# Patient Record
Sex: MALE | Race: WHITE | URBAN - METROPOLITAN AREA
[De-identification: names, ages, dates, MRNs, and addresses within clinical notes are randomized per-mention and may not be internally consistent; named-entity substitution may affect disease eponyms.]

---

## 2022-10-27 ENCOUNTER — OFFICE VISIT (OUTPATIENT)
Dept: FAMILY MEDICINE CLINIC | Age: 57
End: 2022-10-27
Payer: COMMERCIAL

## 2022-10-27 VITALS
SYSTOLIC BLOOD PRESSURE: 122 MMHG | HEART RATE: 78 BPM | WEIGHT: 186 LBS | OXYGEN SATURATION: 95 % | BODY MASS INDEX: 28.19 KG/M2 | DIASTOLIC BLOOD PRESSURE: 86 MMHG | HEIGHT: 68 IN

## 2022-10-27 DIAGNOSIS — Z12.11 COLON CANCER SCREENING: ICD-10-CM

## 2022-10-27 DIAGNOSIS — Z12.5 SCREENING FOR PROSTATE CANCER: ICD-10-CM

## 2022-10-27 DIAGNOSIS — Z76.89 ENCOUNTER TO ESTABLISH CARE: Primary | ICD-10-CM

## 2022-10-27 DIAGNOSIS — Z13.220 SCREENING CHOLESTEROL LEVEL: ICD-10-CM

## 2022-10-27 DIAGNOSIS — Z13.1 SCREENING FOR DIABETES MELLITUS: ICD-10-CM

## 2022-10-27 DIAGNOSIS — L72.0 EPIDERMAL CYST OF NECK: ICD-10-CM

## 2022-10-27 PROCEDURE — G8427 DOCREV CUR MEDS BY ELIG CLIN: HCPCS

## 2022-10-27 PROCEDURE — 3017F COLORECTAL CA SCREEN DOC REV: CPT

## 2022-10-27 PROCEDURE — 99203 OFFICE O/P NEW LOW 30 MIN: CPT

## 2022-10-27 PROCEDURE — G8484 FLU IMMUNIZE NO ADMIN: HCPCS

## 2022-10-27 PROCEDURE — 1036F TOBACCO NON-USER: CPT

## 2022-10-27 PROCEDURE — G8419 CALC BMI OUT NRM PARAM NOF/U: HCPCS

## 2022-10-27 RX ORDER — CEPHALEXIN 500 MG/1
500 CAPSULE ORAL 2 TIMES DAILY
Qty: 14 CAPSULE | Refills: 0 | Status: SHIPPED | OUTPATIENT
Start: 2022-10-27 | End: 2022-11-03

## 2022-10-27 SDOH — ECONOMIC STABILITY: FOOD INSECURITY: WITHIN THE PAST 12 MONTHS, THE FOOD YOU BOUGHT JUST DIDN'T LAST AND YOU DIDN'T HAVE MONEY TO GET MORE.: NEVER TRUE

## 2022-10-27 SDOH — ECONOMIC STABILITY: FOOD INSECURITY: WITHIN THE PAST 12 MONTHS, YOU WORRIED THAT YOUR FOOD WOULD RUN OUT BEFORE YOU GOT MONEY TO BUY MORE.: NEVER TRUE

## 2022-10-27 ASSESSMENT — PATIENT HEALTH QUESTIONNAIRE - PHQ9
1. LITTLE INTEREST OR PLEASURE IN DOING THINGS: 0
2. FEELING DOWN, DEPRESSED OR HOPELESS: 0
SUM OF ALL RESPONSES TO PHQ QUESTIONS 1-9: 0
SUM OF ALL RESPONSES TO PHQ9 QUESTIONS 1 & 2: 0
SUM OF ALL RESPONSES TO PHQ QUESTIONS 1-9: 0

## 2022-10-27 ASSESSMENT — ENCOUNTER SYMPTOMS
CONSTIPATION: 0
EYE PAIN: 0
NAUSEA: 0
EYE DISCHARGE: 0
ABDOMINAL PAIN: 0
DIARRHEA: 0
SINUS PAIN: 0
SORE THROAT: 0
COUGH: 0
WHEEZING: 0
EYE REDNESS: 0
VOMITING: 0
SINUS PRESSURE: 0
ABDOMINAL DISTENTION: 0
SHORTNESS OF BREATH: 0

## 2022-10-27 ASSESSMENT — SOCIAL DETERMINANTS OF HEALTH (SDOH): HOW HARD IS IT FOR YOU TO PAY FOR THE VERY BASICS LIKE FOOD, HOUSING, MEDICAL CARE, AND HEATING?: NOT HARD AT ALL

## 2022-10-27 NOTE — PROGRESS NOTES
Divina Monahan (:  1965) is a 61 y.o. male,Established patient, here for evaluation of the following chief complaint(s):  New Patient (NEW PATIENT RE EST CARE ), Cyst (CYST ON LEFT SIDE OF NECK, NOTICED ABOUT 2 MO AGO, DRAINING ), and Ear Fullness (LEFT EAR FULLNESS, POSSIBLE WAX IMPACTION )         ASSESSMENT/PLAN:  1. Encounter to establish care  -Personal medical history, surgical history, family history reviewed. Medications reconciled. -Care gaps addressed. Declined screenings and vaccinations today.  -Educated on office policies and procedures.  -Follow-up in 1 year for annual physical and fasting blood work. 2. Epidermal cyst of neck  -Cyst to the left of the neck. -Since this it opened on its own, I was able to manually express some pus as well as some blood.  -Continues to be firm, tender, elevated, and erythematic.  -Treatment options discussed. Keflex is being sent to the pharmacy. The medication uses and side effects were discussed with the patient. Patient verbalized understanding and agrees to the plan.  -Educated to use warm compresses as well to help express more pus. Can continue cover with a Band-Aid.  -Follow-up if symptoms worsen or fail to improve. -     cephALEXin (KEFLEX) 500 MG capsule; Take 1 capsule by mouth 2 times daily for 7 days, Disp-14 capsule, R-0Normal  3. Colon cancer screening  -     Fecal DNA Colorectal cancer screening (Cologuard)  4. Screening for diabetes mellitus  -     Comprehensive Metabolic Panel; Future  5. Screening for prostate cancer  -     PSA Screening; Future  6. Screening cholesterol level  -     Lipid, Fasting; Future    Return in about 1 year (around 10/27/2023) for Physical, Fasting Labs. Subjective   SUBJECTIVE/OBJECTIVE:  SUSAN  Marion Buerger presents today to reestablish care. He also has been experiencing some left ear muffled hearing for the last month and a half or so. He thinks this is due to wax buildup.  Debrox usually works but has not this time. He also has had a cyst on left neck for 2 months. It kept growing, then opened up in last week, clear and white milky discharge. He has since covered in a band aid. Hot showers help. Works outside at [employer], where he is pretty active. Otherwise pretty healthy. He has had a sore right shoulder ever since fall 2013, as well as a sore left shoulder pain since a 2014 fall on ice. He takes ibuprofen, 2 at time, which works pretty well. Lidocaine patches don't help. He states that he doesn't like needles. He eats what he likes but make sure to include vegetables. He tries to avoid greasy foods, and drinks 5x bottles of water a day. He also drinks iced tea at work, unsweetened. Only drinks 2 cans of pop a day, used to be much more. Review of Systems   Constitutional:  Negative for chills and fever. HENT:  Positive for hearing loss (Ear fullness bilaterally). Negative for ear discharge, ear pain, sinus pressure, sinus pain and sore throat. Eyes:  Negative for pain, discharge and redness. Respiratory:  Negative for cough, shortness of breath and wheezing. Cardiovascular:  Negative for chest pain and palpitations. Gastrointestinal:  Negative for abdominal distention, abdominal pain, constipation, diarrhea, nausea and vomiting. Genitourinary:  Negative for dysuria and hematuria. Musculoskeletal:  Positive for arthralgias (bilateral shoulder pain, chronic, mild). Negative for myalgias. Skin:  Negative for rash. Neurological:  Negative for weakness, numbness and headaches. Psychiatric/Behavioral:  Negative for dysphoric mood. The patient is not nervous/anxious. Objective   Physical Exam  Vitals reviewed. Constitutional:       General: He is not in acute distress. Appearance: Normal appearance. He is normal weight. HENT:      Head: Normocephalic and atraumatic. Right Ear: Tympanic membrane, ear canal and external ear normal. There is impacted cerumen. Left Ear: Tympanic membrane, ear canal and external ear normal. There is impacted cerumen. Nose: Nose normal.      Mouth/Throat:      Mouth: Mucous membranes are moist.      Pharynx: Oropharynx is clear. No posterior oropharyngeal erythema. Eyes:      General: No scleral icterus. Right eye: No discharge. Left eye: No discharge. Extraocular Movements: Extraocular movements intact. Pupils: Pupils are equal, round, and reactive to light. Neck:      Vascular: No carotid bruit. Cardiovascular:      Rate and Rhythm: Normal rate and regular rhythm. Pulses: Normal pulses. Heart sounds: Normal heart sounds. No murmur heard. No gallop. Pulmonary:      Effort: Pulmonary effort is normal. No respiratory distress. Breath sounds: Normal breath sounds. No wheezing. Chest:      Chest wall: No tenderness. Abdominal:      General: Bowel sounds are normal.      Palpations: Abdomen is soft. Tenderness: There is no abdominal tenderness. There is no guarding or rebound. Musculoskeletal:         General: No swelling or tenderness. Normal range of motion. Cervical back: Normal range of motion and neck supple. Right lower leg: No edema. Left lower leg: No edema. Skin:     General: Skin is warm and dry. Capillary Refill: Capillary refill takes less than 2 seconds. Coloration: Skin is not jaundiced or pale. Findings: Lesion (cyst left neck) present. No rash. Neurological:      Mental Status: He is alert and oriented to person, place, and time. Mental status is at baseline. Psychiatric:         Mood and Affect: Mood normal.         Behavior: Behavior normal.         Thought Content: Thought content normal.         Judgment: Judgment normal.            Please note that this chart was generated using dragon dictation software.   Although every effort was made to ensure the accuracy of this automated transcription, some errors in transcription may have occurred. An electronic signature was used to authenticate this note.     --Jaspreet Buenrostro, TRISH - CNP

## 2022-10-27 NOTE — PATIENT INSTRUCTIONS
When you go for blood work, make sure you fast for 8 hours before at least, only water and black coffee. Use warm compress on the cyst. Take the antibiotic as directed until completion.